# Patient Record
Sex: MALE | Race: OTHER
[De-identification: names, ages, dates, MRNs, and addresses within clinical notes are randomized per-mention and may not be internally consistent; named-entity substitution may affect disease eponyms.]

---

## 2018-12-04 ENCOUNTER — HOSPITAL ENCOUNTER (EMERGENCY)
Dept: HOSPITAL 61 - ER | Age: 28
Discharge: HOME | End: 2018-12-04
Payer: SELF-PAY

## 2018-12-04 VITALS — SYSTOLIC BLOOD PRESSURE: 142 MMHG | DIASTOLIC BLOOD PRESSURE: 79 MMHG

## 2018-12-04 VITALS — WEIGHT: 180 LBS | BODY MASS INDEX: 23.1 KG/M2 | HEIGHT: 74 IN

## 2018-12-04 DIAGNOSIS — H10.89: Primary | ICD-10-CM

## 2018-12-04 PROCEDURE — 99283 EMERGENCY DEPT VISIT LOW MDM: CPT

## 2018-12-04 NOTE — PHYS DOC
Past Medical History


Past Medical History:  No Pertinent History


Past Surgical History:  No Surgical History


Alcohol Use:  Occasionally


Drug Use:  None





Adult General


Chief Complaint


Chief Complaint:  EYE PROBLEMS





HPI


HPI





Patient is a 28  year old male who presents with bilateral drainage and 

discomfort.  Patient has had symptoms over the last week. He complains that his 

eyes are matted shut in the mornings. He has drainage and conjunctival 

irritation and discomfort. Primarily in the right eye but has extended to the 

left eye in the last couple of days. He was exposed to someone with known 

conjunctivitis about one week earlier. Severe pain. Denies vision loss. No 

trauma. No foreign body sensation.





Review of Systems


Review of Systems





Constitutional: Denies fever


Eyes: Denies change in visual acuity


HENT: Denies nasal congestion or sore throat 


Respiratory: Denies cough or shortness of breath 


Cardiovascular: No additional information not addressed in HPI 


Musculoskeletal: Denies back pain or joint pain 


Integument: Denies rash 


Neurologic: Denies headache


Endocrine: Denies polyuria 





All other systems were reviewed and found to be within normal limits, except as 

documented in this note.





Current Medications


Current Medications





Current Medications








 Medications


  (Trade)  Dose


 Ordered  Sig/Carmen  Start Time


 Stop Time Status Last Admin


Dose Admin


 


 Polymyxin/


 Trimethoprim


 Sulfate


  (Polytrim)  1 drop  1X  ONCE  12/4/18 05:45


 12/4/18 05:46 DC 12/4/18 05:49


1 DROP











Allergies


Allergies





Allergies








Coded Allergies Type Severity Reaction Last Updated Verified


 


  No Known Drug Allergies    12/4/18 No











Physical Exam


Physical Exam





Constitutional: Well developed, well nourished, no acute distress, non-toxic 

appearance


HENT: Normocephalic, atraumatic, bilateral external ears normal, oropharynx 

moist, no oral exudates


Eyes: PERRLA, EOMI, conjunctival injection with chemosis of the right eye and 

more mild sx in the left.  no severe pain with EOMI 


Neck: Normal range of motion, no tenderness  


Skin: Warm, dry, no erythema, no rash. 


Back: No tenderness  


Neurologic: Alert and oriented X 3


Psychologic: Affect normal





Current Patient Data


Vital Signs





 Vital Signs








  Date Time  Temp Pulse Resp B/P (MAP) Pulse Ox O2 Delivery O2 Flow Rate FiO2


 


12/4/18 05:20 98.3 94 18 142/79 (100) 98 Room Air  





 98.3       











EKG


EKG


[]





Radiology/Procedures


Radiology/Procedures


[]





Course & Med Decision Making


Course & Med Decision Making


Pertinent Labs and Imaging studies reviewed. (See chart for details)





Patient is seen and examined immediately on arrival.  Conjunctivitis in the 

right eye on physical exam and probable early conjunctivitis in the left.  No 

vision loss.  No hx of trauma and no FB.  Patient is provided polytrim gtts in 

the ER and given the first dose.  Discharged home with the same and with Rx for 

omnipred for comfort and swelling.  Norco for more severe pain.  Advised to 

return to the ER or f/u with ophtho if not improving in next 24-48 hours.





Dragon Disclaimer


Dragon Disclaimer


This electronic medical record was generated, in whole or in part, using a 

voice recognition dictation system.





Departure


Departure


Impression:  


 Primary Impression:  


 Conjunctivitis


Disposition:  01 HOME, SELF-CARE


Condition:  GOOD


Patient Instructions:  Conjunctivitis (Viral and Bacterial)


Scripts


Hydrocodone/Apap 5-325 (NORCO 5-325 TABLET) 1 Each Tablet


1-2 EACH PO PRN Q6HRS PRN for PAIN, #15


   as needed for pain


   Prov: BRAEDEN SALEEM DO         12/4/18 


Prednisolone Acetate (OMNIPRED) 10 Ml Drops.susp


2 DROP OS TID, #5 ML 1 Refill


   Prov: BRAEDEN SALEEM DO         12/4/18











BRAEDEN SALEEM DO Dec 4, 2018 05:37